# Patient Record
Sex: FEMALE | URBAN - METROPOLITAN AREA
[De-identification: names, ages, dates, MRNs, and addresses within clinical notes are randomized per-mention and may not be internally consistent; named-entity substitution may affect disease eponyms.]

---

## 2019-10-18 ENCOUNTER — OFFICE VISIT (OUTPATIENT)
Dept: URBAN - METROPOLITAN AREA CLINIC 23 | Facility: CLINIC | Age: 69
End: 2019-10-18
Payer: COMMERCIAL

## 2019-10-18 DIAGNOSIS — H43.12 VITREOUS HEMORRHAGE, LEFT EYE: Primary | ICD-10-CM

## 2019-10-18 DIAGNOSIS — D64.9 ANEMIA: ICD-10-CM

## 2019-10-18 PROCEDURE — 92004 COMPRE OPH EXAM NEW PT 1/>: CPT | Performed by: OPTOMETRIST

## 2019-10-18 ASSESSMENT — KERATOMETRY
OS: 44.00
OD: 43.63

## 2019-10-18 ASSESSMENT — INTRAOCULAR PRESSURE
OS: 12
OD: 12

## 2019-10-18 NOTE — IMPRESSION/PLAN
Impression: Vitreous hemorrhage, left eye: H43.12. Plan: Discussed findings. Discussed signs of RD. Pt to see retina specialist. Carvel Milan done.

## 2019-10-18 NOTE — IMPRESSION/PLAN
Impression: Anemia: D64.9. Plan: Discussed findings. Retinopathy seen OU, poss due to anemia. Pt states she does not have diabetes. Monitor.

## 2019-10-28 ENCOUNTER — OFFICE VISIT (OUTPATIENT)
Dept: URBAN - METROPOLITAN AREA CLINIC 23 | Facility: CLINIC | Age: 69
End: 2019-10-28
Payer: COMMERCIAL

## 2019-10-28 PROCEDURE — 92134 CPTRZ OPH DX IMG PST SGM RTA: CPT | Performed by: OPHTHALMOLOGY

## 2019-10-28 PROCEDURE — 92004 COMPRE OPH EXAM NEW PT 1/>: CPT | Performed by: OPHTHALMOLOGY

## 2019-10-28 ASSESSMENT — INTRAOCULAR PRESSURE
OS: 10
OD: 11

## 2019-10-28 NOTE — IMPRESSION/PLAN
Impression: Vitreous hemorrhage, left eye: H43.12. OS. Condition: unstable. Vision: vision affected. Plan: Discussed diagnosis in detail with patient. Exam OS shows no definite RT or RD with Vitreous Hemorrhage. Recommend close observation for now. Will reassess the retina in 2 wks, sooner if there is a change or decrease in vision.  OCT OS is stable (stable OD)

## 2019-11-13 ENCOUNTER — OFFICE VISIT (OUTPATIENT)
Dept: URBAN - METROPOLITAN AREA CLINIC 23 | Facility: CLINIC | Age: 69
End: 2019-11-13
Payer: COMMERCIAL

## 2019-11-13 PROCEDURE — 92134 CPTRZ OPH DX IMG PST SGM RTA: CPT | Performed by: OPHTHALMOLOGY

## 2019-11-13 PROCEDURE — 99213 OFFICE O/P EST LOW 20 MIN: CPT | Performed by: OPHTHALMOLOGY

## 2019-11-13 ASSESSMENT — INTRAOCULAR PRESSURE
OD: 11
OS: 10

## 2019-11-13 NOTE — IMPRESSION/PLAN
Impression: Vitreous hemorrhage, left eye: H43.12. OS. Condition: improving. Vision: vision improved. Plan: Discussed diagnosis in detail with patient. Exam OS shows a decrease in VH with no RT or RD. No treatment is required at this time. Recommend to follow - up in 4 mos with Dr Diego Magaña to reassess condition.